# Patient Record
Sex: MALE | Race: BLACK OR AFRICAN AMERICAN | NOT HISPANIC OR LATINO | URBAN - METROPOLITAN AREA
[De-identification: names, ages, dates, MRNs, and addresses within clinical notes are randomized per-mention and may not be internally consistent; named-entity substitution may affect disease eponyms.]

---

## 2017-07-01 ENCOUNTER — OUTPATIENT (OUTPATIENT)
Dept: OUTPATIENT SERVICES | Age: 22
LOS: 1 days | End: 2017-07-01

## 2017-07-01 ENCOUNTER — APPOINTMENT (OUTPATIENT)
Dept: MRI IMAGING | Facility: HOSPITAL | Age: 22
End: 2017-07-01

## 2017-07-01 DIAGNOSIS — G91.9 HYDROCEPHALUS, UNSPECIFIED: ICD-10-CM

## 2017-07-11 ENCOUNTER — OUTPATIENT (OUTPATIENT)
Dept: OUTPATIENT SERVICES | Facility: HOSPITAL | Age: 22
LOS: 1 days | End: 2017-07-11

## 2017-07-11 VITALS
HEART RATE: 75 BPM | DIASTOLIC BLOOD PRESSURE: 70 MMHG | SYSTOLIC BLOOD PRESSURE: 110 MMHG | OXYGEN SATURATION: 97 % | HEIGHT: 70 IN | RESPIRATION RATE: 16 BRPM | WEIGHT: 151.02 LBS | TEMPERATURE: 97 F

## 2017-07-11 DIAGNOSIS — Z91.040 LATEX ALLERGY STATUS: ICD-10-CM

## 2017-07-11 DIAGNOSIS — G91.9 HYDROCEPHALUS, UNSPECIFIED: ICD-10-CM

## 2017-07-11 DIAGNOSIS — Z98.2 PRESENCE OF CEREBROSPINAL FLUID DRAINAGE DEVICE: Chronic | ICD-10-CM

## 2017-07-11 DIAGNOSIS — J45.909 UNSPECIFIED ASTHMA, UNCOMPLICATED: ICD-10-CM

## 2017-07-11 LAB
BASOPHILS # BLD AUTO: 0.03 K/UL — SIGNIFICANT CHANGE UP (ref 0–0.2)
BASOPHILS NFR BLD AUTO: 0.9 % — SIGNIFICANT CHANGE UP (ref 0–2)
BLD GP AB SCN SERPL QL: NEGATIVE — SIGNIFICANT CHANGE UP
BUN SERPL-MCNC: 15 MG/DL — SIGNIFICANT CHANGE UP (ref 7–23)
CALCIUM SERPL-MCNC: 9.7 MG/DL — SIGNIFICANT CHANGE UP (ref 8.4–10.5)
CHLORIDE SERPL-SCNC: 101 MMOL/L — SIGNIFICANT CHANGE UP (ref 98–107)
CO2 SERPL-SCNC: 29 MMOL/L — SIGNIFICANT CHANGE UP (ref 22–31)
CREAT SERPL-MCNC: 1.11 MG/DL — SIGNIFICANT CHANGE UP (ref 0.5–1.3)
EOSINOPHIL # BLD AUTO: 0.23 K/UL — SIGNIFICANT CHANGE UP (ref 0–0.5)
EOSINOPHIL NFR BLD AUTO: 6.8 % — HIGH (ref 0–6)
GIANT PLATELETS BLD QL SMEAR: PRESENT — SIGNIFICANT CHANGE UP
GLUCOSE SERPL-MCNC: 69 MG/DL — LOW (ref 70–99)
HCT VFR BLD CALC: 51.2 % — HIGH (ref 39–50)
HGB BLD-MCNC: 17.1 G/DL — HIGH (ref 13–17)
IMM GRANULOCYTES # BLD AUTO: 0 # — SIGNIFICANT CHANGE UP
IMM GRANULOCYTES NFR BLD AUTO: 0 % — SIGNIFICANT CHANGE UP (ref 0–1.5)
LYMPHOCYTES # BLD AUTO: 1.34 K/UL — SIGNIFICANT CHANGE UP (ref 1–3.3)
LYMPHOCYTES # BLD AUTO: 39.4 % — SIGNIFICANT CHANGE UP (ref 13–44)
MANUAL SMEAR VERIFICATION: SIGNIFICANT CHANGE UP
MCHC RBC-ENTMCNC: 31.6 PG — SIGNIFICANT CHANGE UP (ref 27–34)
MCHC RBC-ENTMCNC: 33.4 % — SIGNIFICANT CHANGE UP (ref 32–36)
MCV RBC AUTO: 94.6 FL — SIGNIFICANT CHANGE UP (ref 80–100)
MONOCYTES # BLD AUTO: 0.27 K/UL — SIGNIFICANT CHANGE UP (ref 0–0.9)
MONOCYTES NFR BLD AUTO: 7.9 % — SIGNIFICANT CHANGE UP (ref 2–14)
MORPHOLOGY BLD-IMP: NORMAL — SIGNIFICANT CHANGE UP
NEUTROPHILS # BLD AUTO: 1.53 K/UL — LOW (ref 1.8–7.4)
NEUTROPHILS NFR BLD AUTO: 45 % — SIGNIFICANT CHANGE UP (ref 43–77)
NRBC # FLD: 0 — SIGNIFICANT CHANGE UP
PLATELET # BLD AUTO: 126 K/UL — LOW (ref 150–400)
PLATELET CLUMP BLD QL SMEAR: SLIGHT — SIGNIFICANT CHANGE UP
PLATELET COUNT - ESTIMATE: SIGNIFICANT CHANGE UP
PMV BLD: 13.8 FL — HIGH (ref 7–13)
POTASSIUM SERPL-MCNC: 3.8 MMOL/L — SIGNIFICANT CHANGE UP (ref 3.5–5.3)
POTASSIUM SERPL-SCNC: 3.8 MMOL/L — SIGNIFICANT CHANGE UP (ref 3.5–5.3)
RBC # BLD: 5.41 M/UL — SIGNIFICANT CHANGE UP (ref 4.2–5.8)
RBC # FLD: 12.7 % — SIGNIFICANT CHANGE UP (ref 10.3–14.5)
RH IG SCN BLD-IMP: POSITIVE — SIGNIFICANT CHANGE UP
SODIUM SERPL-SCNC: 145 MMOL/L — SIGNIFICANT CHANGE UP (ref 135–145)
WBC # BLD: 3.4 K/UL — LOW (ref 3.8–10.5)
WBC # FLD AUTO: 3.4 K/UL — LOW (ref 3.8–10.5)

## 2017-07-11 NOTE — H&P PST ADULT - HISTORY OF PRESENT ILLNESS
Pt is a 22 yo. male ; pt reports h/o Hydrocephalus ; s/p insertion of  shunt 1998. Pt f/u with surgeon ; MRI 7/01/17; pt now presents for Ventriculoperitoneal Shunt Ligation.

## 2017-07-11 NOTE — H&P PST ADULT - RS GEN PE MLT RESP DETAILS PC
clear to auscultation bilaterally/respirations non-labored/breath sounds equal/airway patent/good air movement

## 2017-07-11 NOTE — H&P PST ADULT - NSANTHOSAYNRD_GEN_A_CORE
No. NAOMIE screening performed.  STOP BANG Legend: 0-2 = LOW Risk; 3-4 = INTERMEDIATE Risk; 5-8 = HIGH Risk

## 2017-07-11 NOTE — H&P PST ADULT - NEGATIVE NEUROLOGICAL SYMPTOMS
no syncope/no tremors/no loss of sensation/no loss of consciousness/no weakness/no generalized seizures/no paresthesias/no vertigo/no headache/no transient paralysis/no focal seizures/no difficulty walking

## 2017-07-11 NOTE — H&P PST ADULT - PROBLEM SELECTOR PLAN 1
Ventriculoperitoneal Shunt Ligation    Pre op instruction including pepcid given to pt ; pt appears to have a good understanding of pre op instructions    Pt to Dr Franco for pre op m/c

## 2017-07-13 ENCOUNTER — INPATIENT (INPATIENT)
Facility: HOSPITAL | Age: 22
LOS: 0 days | Discharge: HOME | End: 2017-07-14
Attending: NEUROLOGICAL SURGERY | Admitting: NEUROLOGICAL SURGERY
Payer: COMMERCIAL

## 2017-07-13 VITALS
TEMPERATURE: 98 F | HEIGHT: 70 IN | RESPIRATION RATE: 14 BRPM | DIASTOLIC BLOOD PRESSURE: 66 MMHG | HEART RATE: 87 BPM | SYSTOLIC BLOOD PRESSURE: 123 MMHG | WEIGHT: 151.02 LBS | OXYGEN SATURATION: 97 %

## 2017-07-13 DIAGNOSIS — G91.9 HYDROCEPHALUS, UNSPECIFIED: ICD-10-CM

## 2017-07-13 DIAGNOSIS — Z98.2 PRESENCE OF CEREBROSPINAL FLUID DRAINAGE DEVICE: Chronic | ICD-10-CM

## 2017-07-13 DIAGNOSIS — Z98.2 PRESENCE OF CEREBROSPINAL FLUID DRAINAGE DEVICE: ICD-10-CM

## 2017-07-13 LAB — RH IG SCN BLD-IMP: POSITIVE — SIGNIFICANT CHANGE UP

## 2017-07-13 RX ORDER — ONDANSETRON 8 MG/1
4 TABLET, FILM COATED ORAL ONCE
Qty: 0 | Refills: 0 | Status: DISCONTINUED | OUTPATIENT
Start: 2017-07-13 | End: 2017-07-14

## 2017-07-13 RX ORDER — OXYCODONE AND ACETAMINOPHEN 5; 325 MG/1; MG/1
1 TABLET ORAL EVERY 4 HOURS
Qty: 0 | Refills: 0 | Status: DISCONTINUED | OUTPATIENT
Start: 2017-07-13 | End: 2017-07-14

## 2017-07-13 RX ORDER — CEFAZOLIN SODIUM 1 G
2000 VIAL (EA) INJECTION EVERY 8 HOURS
Qty: 0 | Refills: 0 | Status: COMPLETED | OUTPATIENT
Start: 2017-07-14 | End: 2017-07-14

## 2017-07-13 RX ORDER — HYDROMORPHONE HYDROCHLORIDE 2 MG/ML
0.5 INJECTION INTRAMUSCULAR; INTRAVENOUS; SUBCUTANEOUS
Qty: 0 | Refills: 0 | Status: DISCONTINUED | OUTPATIENT
Start: 2017-07-13 | End: 2017-07-14

## 2017-07-13 RX ORDER — ACETAMINOPHEN 500 MG
650 TABLET ORAL EVERY 6 HOURS
Qty: 0 | Refills: 0 | Status: DISCONTINUED | OUTPATIENT
Start: 2017-07-13 | End: 2017-07-14

## 2017-07-13 RX ORDER — METOCLOPRAMIDE HCL 10 MG
10 TABLET ORAL EVERY 6 HOURS
Qty: 0 | Refills: 0 | Status: DISCONTINUED | OUTPATIENT
Start: 2017-07-13 | End: 2017-07-14

## 2017-07-13 RX ORDER — DEXTROSE MONOHYDRATE, SODIUM CHLORIDE, AND POTASSIUM CHLORIDE 50; .745; 4.5 G/1000ML; G/1000ML; G/1000ML
1000 INJECTION, SOLUTION INTRAVENOUS
Qty: 0 | Refills: 0 | Status: DISCONTINUED | OUTPATIENT
Start: 2017-07-13 | End: 2017-07-14

## 2017-07-13 RX ORDER — SODIUM CHLORIDE 9 MG/ML
1000 INJECTION, SOLUTION INTRAVENOUS
Qty: 0 | Refills: 0 | Status: DISCONTINUED | OUTPATIENT
Start: 2017-07-13 | End: 2017-07-13

## 2017-07-13 RX ORDER — FENTANYL CITRATE 50 UG/ML
50 INJECTION INTRAVENOUS
Qty: 0 | Refills: 0 | Status: DISCONTINUED | OUTPATIENT
Start: 2017-07-13 | End: 2017-07-14

## 2017-07-13 RX ORDER — DOCUSATE SODIUM 100 MG
100 CAPSULE ORAL THREE TIMES A DAY
Qty: 0 | Refills: 0 | Status: DISCONTINUED | OUTPATIENT
Start: 2017-07-13 | End: 2017-07-14

## 2017-07-13 RX ORDER — ALBUTEROL 90 UG/1
2 AEROSOL, METERED ORAL EVERY 6 HOURS
Qty: 0 | Refills: 0 | Status: DISCONTINUED | OUTPATIENT
Start: 2017-07-13 | End: 2017-07-14

## 2017-07-13 RX ORDER — ALBUTEROL 90 UG/1
0 AEROSOL, METERED ORAL
Qty: 0 | Refills: 0 | COMMUNITY

## 2017-07-13 RX ADMIN — DEXTROSE MONOHYDRATE, SODIUM CHLORIDE, AND POTASSIUM CHLORIDE 75 MILLILITER(S): 50; .745; 4.5 INJECTION, SOLUTION INTRAVENOUS at 18:02

## 2017-07-13 NOTE — CONSULT NOTE ADULT - SUBJECTIVE AND OBJECTIVE BOX
SICU Consultation Note  =====================================================  HPI: 21y male s/p removal of VPS. Pt had shunt placed at 5mos of age, never had shunt revised. Was found to have no proximal flow of CSF intraoperatively and VPS valve and distal tubing were removed. Pt tolerated procedure well. Was extubated and transferred to PACU in stable condition. He denies HA, confusion, dizziness, weakness, numbness, vision changes, nausea, or emesis.    Surgery Information Removal of VPS valve and distal tubing  Case Duration: 	EBL: 10 IV Fluids: 500	Blood Products: x	Urine Output: x  Complications: None    HISTORY  21y Male  HPI:  Pt is a 20 yo. male ; pt reports h/o Hydrocephalus ; s/p insertion of  shunt 1998. Pt f/u with surgeon ; MRI 7/01/17; pt now presents for Ventriculoperitoneal Shunt Ligation. (11 Jul 2017 09:04)    Allergies:   PAST MEDICAL & SURGICAL HISTORY:  Hydrocephalus  Asthma  S/P  shunt: 1998    FAMILY HISTORY: Noncontributory      SOCIAL HISTORY: Noncontributory    ADVANCE DIRECTIVES: Presumed Full Code    REVIEW OF SYSTEMS: 10 point ROS negative unless otherwise noted in HPI  General: Non-Contributory  Skin/Breast: Non-Contributory  Ophthalmologic: Non-Contributory  ENMT: Non-Contributory  Respiratory and Thorax: Non-Contributory  Cardiovascular: Non-Contributory  Gastrointestinal: Non-Contributory  Genitourinary: Non-Contributory  Musculoskeletal: Non-Contributory  Neurological: Non-Contributory  Psychiatric: Non-Contributory  Hematology/Lymphatics: Non-Contributory  Endocrine: Non-Contributory  Allergic/Immunologic: Non-Contributory    HOME MEDICATIONS:    CURRENT MEDICATIONS:   --------------------------------------------------------------------------------------  Neurologic Medications  fentaNYL    Injectable 50 MICROGram(s) IV Push every 5 minutes PRN Severe Pain  HYDROmorphone  Injectable 0.5 milliGRAM(s) IV Push every 10 minutes PRN Moderate Pain  ondansetron Injectable 4 milliGRAM(s) IV Push once PRN Nausea and/or Vomiting  acetaminophen   Tablet 650 milliGRAM(s) Oral every 6 hours PRN For Temp greater than 38 C (100.4 F)  acetaminophen   Tablet. 650 milliGRAM(s) Oral every 6 hours PRN Mild Pain (1 - 3)  oxyCODONE    5 mG/acetaminophen 325 mG 1 Tablet(s) Oral every 4 hours PRN Moderate Pain    Respiratory Medications  ALBUTerol    90 MICROgram(s) HFA Inhaler 2 Puff(s) Inhalation every 6 hours PRN Shortness of Breath and/or Wheezing    Cardiovascular Medications    Gastrointestinal Medications  sodium chloride 0.9% with potassium chloride 20 mEq/L 1000 milliLiter(s) IV Continuous <Continuous>  docusate sodium 100 milliGRAM(s) Oral three times a day PRN Constipation    Genitourinary Medications    Hematologic/Oncologic Medications    Antimicrobial/Immunologic Medications    Endocrine/Metabolic Medications    Topical/Other Medications    --------------------------------------------------------------------------------------    VITAL SIGNS, INS/OUTS (last 24 hours):  --------------------------------------------------------------------------------------  T(C): 36.7 (07-13-17 @ 20:00), Max: 37.1 (07-13-17 @ 17:55)  HR: 81 (07-13-17 @ 20:00) (70 - 87)  BP: 110/60 (07-13-17 @ 20:00) (99/42 - 123/66)  BP(mean): --  ABP: --  ABP(mean): --  RR: 12 (07-13-17 @ 20:00) (11 - 18)  SpO2: 99% (07-13-17 @ 20:00) (97% - 99%)  Wt(kg): --  CVP(mm Hg): --  CI: --  CAPILLARY BLOOD GLUCOSE       N/A      07-13 @ 07:01  -  07-13 @ 21:20  --------------------------------------------------------  IN:    Oral Fluid: 540 mL    sodium chloride 0.9% with potassium chloride 20 mEq/L: 225 mL  Total IN: 765 mL    OUT:    Voided: 175 mL  Total OUT: 175 mL    Total NET: 590 mL        --------------------------------------------------------------------------------------    EXAM  NEUROLOGY  RASS:   	GCS: 15   Exam: Normal, NAD, alert, oriented x 3, no focal deficits. CN II-XII intact    HEENT  Exam: Normocephalic, R craniotomy incision c/d/i.  EOMI, PERRLA    RESPIRATORY  Exam: Lungs clear to auscultation, Normal expansion/effort.    CARDIOVASCULAR  Exam: S1, S2.  Regular rate and rhythm.  Peripheral edema    GI/NUTRITION  Exam: Abdomen soft, Non-tender, Non-distended.  Current Diet:  ADAT    VASCULAR  Exam: Extremities warm, pink, well-perfused.    MUSCULOSKELETAL  Exam: All extremities moving spontaneously without limitations.    SKIN:  Exam: Good skin turgor, no skin breakdown.    METABOLIC/FLUIDS/ELECTROLYTES  sodium chloride 0.9% with potassium chloride 20 mEq/L 1000 milliLiter(s) IV Continuous <Continuous>      HEMATOLOGIC  [x] DVT Prophylaxis: Venodynes  Transfusions:	[] PRBC	[] Platelets		[] FFP	[] Cryoprecipitate    INFECTIOUS DISEASE  Antimicrobials/Immunologic Medications:    Tubes/Lines/Drains  [x] Peripheral IV  [] Central Venous Line     	[] R	[] L	[] IJ	[] Fem	[] SC	Date Placed:   [] Arterial Line		[] R	[] L	[] Fem	[] Rad	[] Ax	Date Placed:   [] PICC:         	[] Midline		[] Mediport  [] Urinary Catheter		Date Placed:     LABS  --------------------------------------------------------------------------------------  None  --------------------------------------------------------------------------------------    ASSESSMENT:  21y Male s/p removal of VPS valve and distal tubing for nonfunctional shunt    PLAN:  Neurologic: q1hr neuro checks, pain control PRN  Respiratory: Satting well on RA, able to protect airway  Cardiovascular: Monitor vitals  Gastrointestinal/Nutrition: Regular diet  Renal/Genitourinary: Monitor UOP, replete electrolytes PRN  Hematologic: chemical DVT held postop, Venodynes in place, monitor H/H  Infectious Disease: No evidence of active infections at this time  Tubes/Lines/Drains: None  Endocrine: No acute concerns  Disposition: PACU overnight, likely to floor in AM    --------------------------------------------------------------------------------------    Critical Care Diagnoses:  h/o hydrocephalus s/p VPS

## 2017-07-13 NOTE — PROGRESS NOTE ADULT - SUBJECTIVE AND OBJECTIVE BOX
Neurosurgery postop  C/O hiccups, no discomfort or nausea  Vital Signs Last 24 Hrs  T(C): 36.7 (13 Jul 2017 20:00), Max: 37.1 (13 Jul 2017 17:55)  T(F): 98 (13 Jul 2017 20:00), Max: 98.8 (13 Jul 2017 17:55)  HR: 81 (13 Jul 2017 20:00) (70 - 87)  BP: 110/60 (13 Jul 2017 20:00) (99/42 - 123/66)  RR: 12 (13 Jul 2017 20:00) (11 - 18)  SpO2: 99% (13 Jul 2017 20:00) (97% - 99%)    AAO X 3  PERRLA, EOMI  CN 2-12 grossly intact  HUNTER strength 5/5 X 4  Dressing clean, dry, intact

## 2017-07-13 NOTE — CONSULT NOTE ADULT - ATTENDING COMMENTS
I have reviewed the history, pertinent labs and imaging, and discussed the care with the consult resident.  Agree with his assessment and plan.

## 2017-07-14 ENCOUNTER — TRANSCRIPTION ENCOUNTER (OUTPATIENT)
Age: 22
End: 2017-07-14

## 2017-07-14 VITALS
HEART RATE: 65 BPM | RESPIRATION RATE: 15 BRPM | OXYGEN SATURATION: 100 % | SYSTOLIC BLOOD PRESSURE: 111 MMHG | DIASTOLIC BLOOD PRESSURE: 56 MMHG

## 2017-07-14 DIAGNOSIS — Z78.9 OTHER SPECIFIED HEALTH STATUS: ICD-10-CM

## 2017-07-14 PROCEDURE — 70551 MRI BRAIN STEM W/O DYE: CPT | Mod: 26

## 2017-07-14 RX ORDER — ACETAMINOPHEN 500 MG
2 TABLET ORAL
Qty: 0 | Refills: 0 | COMMUNITY
Start: 2017-07-14

## 2017-07-14 RX ORDER — OXYCODONE HYDROCHLORIDE 5 MG/1
1 TABLET ORAL
Qty: 18 | Refills: 0 | OUTPATIENT
Start: 2017-07-14 | End: 2017-07-17

## 2017-07-14 RX ADMIN — Medication 650 MILLIGRAM(S): at 12:00

## 2017-07-14 RX ADMIN — Medication 100 MILLIGRAM(S): at 00:04

## 2017-07-14 RX ADMIN — Medication 100 MILLIGRAM(S): at 07:32

## 2017-07-14 RX ADMIN — Medication 650 MILLIGRAM(S): at 13:07

## 2017-07-14 NOTE — PROGRESS NOTE ADULT - SUBJECTIVE AND OBJECTIVE BOX
Neurosurgery Resident Note    Overnight Events: no acute events overnight    Clinical Course: 21yom s/p ligation of proximal VPS catheter, removal of VPS valve and distal catheter on 7/13/2017. POD # 1    VS: T(C): 36.3 (07-14-17 @ 04:00)  HR: 58 (07-14-17 @ 06:00)  BP: 114/65 (07-14-17 @ 06:00)  RR: 14 (07-14-17 @ 06:00)  SpO2: 98% (07-14-17 @ 06:00)  Wt(kg): --    Exam:   AAOx3  PERRL, EOMI, face symmetric, no tongue deviation  5/5 throughout, no pronator drift  Sensation intact to light touch throughout  Reflexes 2+ throughout  No dysmetria    Drains: none    Medications: none

## 2017-07-14 NOTE — DISCHARGE NOTE ADULT - CARE PROVIDER_API CALL
Geoffrey Muñiz), Neurological Surgery; Pediatric Neurological Surgery  [address] [place] [address]  Phone: [phone]  Fax: [phone]

## 2017-07-14 NOTE — PROGRESS NOTE ADULT - SUBJECTIVE AND OBJECTIVE BOX
Hiccups resolved, resting comfortably  Vital Signs Last 24 Hrs  T(C): 36.4 (14 Jul 2017 01:00), Max: 37.1 (13 Jul 2017 17:55)  T(F): 97.5 (14 Jul 2017 01:00), Max: 98.8 (13 Jul 2017 17:55)  HR: 67 (14 Jul 2017 03:00) (61 - 87)  BP: 121/59 (14 Jul 2017 03:00) (99/42 - 128/82)  RR: 19 (14 Jul 2017 03:00) (11 - 19)  SpO2: 100% (14 Jul 2017 03:00) (97% - 100%)    AAO X 3  PERRLA, EOMI  HUNTER strength 5/5 X 4

## 2017-07-14 NOTE — DISCHARGE NOTE ADULT - NS AS ACTIVITY OBS
Do not drive or operate machinery/post op day #4 can shower and get incision wet/No Heavy lifting/straining/Showering allowed

## 2017-07-14 NOTE — DISCHARGE NOTE ADULT - MEDICATION SUMMARY - MEDICATIONS TO TAKE
I will START or STAY ON the medications listed below when I get home from the hospital:    vitamin D  -- 5000 international unit(s) by mouth  1- 2 times weekly  -- Indication: For vitamin    acetaminophen 325 mg oral tablet  -- 2 tab(s) by mouth every 6 hours, As needed, For Temp greater than 38 C (100.4 F)  -- Indication: For fever    acetaminophen 325 mg oral tablet  -- 2 tab(s) by mouth every 6 hours, As needed, Mild Pain (1 - 3)  -- Indication: For pain    acetaminophen-oxycodone 325 mg-5 mg oral tablet  -- 1 tab(s) by mouth every 4 hours, As needed, Moderate Pain MDD:6  -- Indication: For pain    albuterol 90 mcg/inh inhalation aerosol with adapter  --  inhaled , As Needed 1-2 puffs prn  -- Indication: For asthma

## 2017-07-14 NOTE — PROGRESS NOTE ADULT - SUBJECTIVE AND OBJECTIVE BOX
SICU Daily Progress Note  =====================================================  Interval/Overnight Events: Resting comfortably in PACU overnight. No acute events.    POD #  1        	SICU Day # 1    HPI: 21y male s/p removal of VPS. Pt had shunt placed at 3mos of age, never had shunt revised. Was found to have no proximal flow of CSF intraoperatively and VPS valve and distal tubing were removed. Pt tolerated procedure well. Was extubated and transferred to PACU in stable condition. He denies HA, confusion, dizziness, weakness, numbness, vision changes, nausea, or emesis.    PMH: Hydrocephalus s/p VPS placement    Allergies: latex (Rash)  No Known Drug Allergies      MEDICATIONS:   --------------------------------------------------------------------------------------  Neurologic Medications  fentaNYL    Injectable 50 MICROGram(s) IV Push every 5 minutes PRN Severe Pain  HYDROmorphone  Injectable 0.5 milliGRAM(s) IV Push every 10 minutes PRN Moderate Pain  ondansetron Injectable 4 milliGRAM(s) IV Push once PRN Nausea and/or Vomiting  acetaminophen   Tablet 650 milliGRAM(s) Oral every 6 hours PRN For Temp greater than 38 C (100.4 F)  acetaminophen   Tablet. 650 milliGRAM(s) Oral every 6 hours PRN Mild Pain (1 - 3)  oxyCODONE    5 mG/acetaminophen 325 mG 1 Tablet(s) Oral every 4 hours PRN Moderate Pain  metoclopramide Injectable 10 milliGRAM(s) IV Push every 6 hours PRN hiccups    Respiratory Medications  ALBUTerol    90 MICROgram(s) HFA Inhaler 2 Puff(s) Inhalation every 6 hours PRN Shortness of Breath and/or Wheezing    Cardiovascular Medications    Gastrointestinal Medications  sodium chloride 0.9% with potassium chloride 20 mEq/L 1000 milliLiter(s) IV Continuous <Continuous>  docusate sodium 100 milliGRAM(s) Oral three times a day PRN Constipation    Genitourinary Medications    Hematologic/Oncologic Medications    Antimicrobial/Immunologic Medications  ceFAZolin   IVPB 2000 milliGRAM(s) IV Intermittent every 8 hours    Endocrine/Metabolic Medications    Topical/Other Medications    --------------------------------------------------------------------------------------    VITAL SIGNS, INS/OUTS (last 24 hours):  --------------------------------------------------------------------------------------  ((Insert SICU Vitals/Is+Os here))***  --------------------------------------------------------------------------------------    EXAM  NEUROLOGY  RASS:   	GCS:    Exam: Normal, NAD, alert, oriented x3, no focal deficits. CN II-XII intact    HEENT  Exam: Normocephalic, incision c/d/i, EOMI, PERRLA    RESPIRATORY  Exam: Lungs clear to auscultation, Normal expansion/effort.    CARDIOVASCULAR  Exam: S1, S2.  Regular rate and rhythm.    GI/NUTRITION  Exam: Abdomen soft, Non-tender, Non-distended.  Current Diet: Regular    VASCULAR  Exam: Extremities warm, pink, well-perfused.    MUSCULOSKELETAL  Exam: All extremities moving spontaneously without limitations    SKIN  Exam: Good skin turgor, no skin breakdown.    METABOLIC/FLUIDS/ELECTROLYTES  sodium chloride 0.9% with potassium chloride 20 mEq/L 1000 milliLiter(s) IV Continuous <Continuous>      HEMATOLOGIC  [x] VTE Prophylaxis:   Transfusions:	[] PRBC	[] Platelets		[] FFP	[] Cryoprecipitate    INFECTIOUS DISEASE  Antimicrobials/Immunologic Medications:  ceFAZolin   IVPB 2000 milliGRAM(s) IV Intermittent every 8 hours    Tubes/Lines/Drains  [x] Peripheral IV  [] Central Venous Line     	[] R	[] L	[] IJ	[] Fem	[] SC	Date Placed:   [] Arterial Line		[] R	[] L	[] Fem	[] Rad	[] Ax	Date Placed:   [] PICC		[] Midline		[] Mediport  [] Urinary Catheter		Date Placed:   [x] Necessity of urinary, arterial, and venous catheters discussed    LABS  --------------------------------------------------------------------------------------  ((Insert SICU Labs here))***  --------------------------------------------------------------------------------------    ASSESSMENT:  21y Male s/p removal of VPS valve and distal tubing for nonfunctional shunt    PLAN:  Neurologic: q1hr neuro checks, pain control PRN  Respiratory: Satting well on RA, able to protect airway  Cardiovascular: Monitor vitals  Gastrointestinal/Nutrition: Regular diet  Renal/Genitourinary: Monitor UOP, replete electrolytes PRN  Hematologic: chemical DVT held postop, Venodynes in place, monitor H/H  Infectious Disease: No evidence of active infections at this time  Tubes/Lines/Drains: None  Endocrine: No acute concerns  Disposition: PACU overnight, One shot MRI in AM, likely to floor after MRI    --------------------------------------------------------------------------------------    Critical Care Diagnoses:  h/o hydrocephalus s/p VPS

## 2017-07-14 NOTE — DISCHARGE NOTE ADULT - HOSPITAL COURSE
This is a 21y male with hx of hydrocephalus. Patient underwent a procedure to ligate the shunt on 7/13. Patient tolerated procedure well and post op had no headache, nausea or vomiting. One shot MRI was performed which was stable in ventricular size. He has been OOB ambulating well. Stable for discharge home today.

## 2017-07-14 NOTE — PROGRESS NOTE ADULT - SUBJECTIVE AND OBJECTIVE BOX
ANESTHESIA POSTOP CHECK    21y Male POSTOP DAY 1 S/P  shunt ligation    Vital Signs Last 24 Hrs  T(C): 36.8 (14 Jul 2017 08:00), Max: 37.1 (13 Jul 2017 17:55)  T(F): 98.3 (14 Jul 2017 08:00), Max: 98.8 (13 Jul 2017 17:55)  HR: 58 (14 Jul 2017 06:00) (58 - 87)  BP: 114/65 (14 Jul 2017 06:00) (99/42 - 128/82)  BP(mean): --  RR: 14 (14 Jul 2017 06:00) (11 - 19)  SpO2: 98% (14 Jul 2017 06:00) (97% - 100%)  I&O's Summary    13 Jul 2017 07:01  -  14 Jul 2017 07:00  --------------------------------------------------------  IN: 1565 mL / OUT: 1725 mL / NET: -160 mL    14 Jul 2017 07:01  -  14 Jul 2017 08:43  --------------------------------------------------------  IN: 75 mL / OUT: 0 mL / NET: 75 mL        [x ] NO APPARENT ANESTHESIA COMPLICATIONS      Comments:

## 2017-07-14 NOTE — DISCHARGE NOTE ADULT - CARE PLAN
Principal Discharge DX:	Hydrocephalus  Goal:	s/p shunt ligation  Instructions for follow-up, activity and diet:	If headache/nausea/vomiting occurs return to ER  May remove dressings postop day #2  May shower and get incisions wet post op day #4   Follow up In 1 week with Dr Muñiz

## 2017-07-14 NOTE — DISCHARGE NOTE ADULT - PLAN OF CARE
s/p shunt ligation If headache/nausea/vomiting occurs return to ER  May remove dressings postop day #2  May shower and get incisions wet post op day #4   Follow up In 1 week with Dr Muñiz

## 2017-07-14 NOTE — DISCHARGE NOTE ADULT - PATIENT PORTAL LINK FT
“You can access the [facility] Patient Portal, offered by [facility], by registering with the following website: [email or web address]”

## 2024-11-13 NOTE — H&P PST ADULT - NS MD HP PULSE POSTERIOR
"Anesthesia Transfer of Care Note    Patient: Duane Albert Wilson    Procedure(s) Performed: Procedure(s) (LRB):  REPAIR, HERNIA, INGUINAL, WITHOUT HISTORY OF PRIOR REPAIR, AGE 5 YEARS OR OLDER Open Right With Mesh (Right)    Patient location: [facility]    Anesthesia Type: general    Transport from OR: Transported from OR on 6-10 L/min O2 by face mask with adequate spontaneous ventilation    Post pain: adequate analgesia    Post assessment: no apparent anesthetic complications    Post vital signs: stable    Level of consciousness: responds to stimulation    Nausea/Vomiting: no nausea/vomiting    Complications: none    Transfer of care protocol was followed    Last vitals: Visit Vitals  /74 (BP Location: Right arm, Patient Position: Lying)   Pulse (!) 57   Temp 36.3 °C (97.3 °F) (Temporal)   Resp 16   Ht 5' 11" (1.803 m)   Wt 74.8 kg (165 lb)   SpO2 97%   BMI 23.01 kg/m²     "
right normal/left normal

## 2025-04-14 NOTE — ASU PATIENT PROFILE, ADULT - NS PRO PT RIGHT SUPPORT PERSON
Bed in lowest position, wheels locked, appropriate side rails in place/Call bell, personal items and telephone in reach/Instruct patient to call for assistance before getting out of bed or chair/Non-slip footwear when patient is out of bed/[place] to call system/Physically safe environment - no spills, clutter or unnecessary equipment/Purposeful Proactive Rounding/Room/bathroom lighting operational, light cord in reach same name as above